# Patient Record
Sex: FEMALE | Race: OTHER | Employment: UNEMPLOYED | URBAN - METROPOLITAN AREA
[De-identification: names, ages, dates, MRNs, and addresses within clinical notes are randomized per-mention and may not be internally consistent; named-entity substitution may affect disease eponyms.]

---

## 2022-10-21 ENCOUNTER — OFFICE VISIT (OUTPATIENT)
Dept: FAMILY MEDICINE CLINIC | Age: 42
End: 2022-10-21

## 2022-10-21 VITALS
BODY MASS INDEX: 30.82 KG/M2 | HEIGHT: 60 IN | TEMPERATURE: 97.5 F | WEIGHT: 157 LBS | RESPIRATION RATE: 16 BRPM | OXYGEN SATURATION: 99 % | DIASTOLIC BLOOD PRESSURE: 77 MMHG | HEART RATE: 74 BPM | SYSTOLIC BLOOD PRESSURE: 125 MMHG

## 2022-10-21 DIAGNOSIS — E03.9 ACQUIRED HYPOTHYROIDISM: Primary | ICD-10-CM

## 2022-10-21 DIAGNOSIS — E03.9 ACQUIRED HYPOTHYROIDISM: ICD-10-CM

## 2022-10-21 DIAGNOSIS — Z13.6 SCREENING FOR ISCHEMIC HEART DISEASE: ICD-10-CM

## 2022-10-21 DIAGNOSIS — Z59.9 FINANCIAL DIFFICULTIES: ICD-10-CM

## 2022-10-21 DIAGNOSIS — Z59.89 UNINSURED: ICD-10-CM

## 2022-10-21 DIAGNOSIS — Z60.3 IMMIGRANT WITH LANGUAGE DIFFICULTY: ICD-10-CM

## 2022-10-21 LAB
BASOPHILS # BLD: 0 K/UL (ref 0–0.2)
BASOPHILS NFR BLD: 1 % (ref 0–2)
DIFFERENTIAL METHOD BLD: ABNORMAL
EOSINOPHIL # BLD: 0.2 K/UL (ref 0–0.8)
EOSINOPHIL NFR BLD: 3 % (ref 0.5–7.8)
ERYTHROCYTE [DISTWIDTH] IN BLOOD BY AUTOMATED COUNT: 17 % (ref 11.9–14.6)
HCT VFR BLD AUTO: 29.9 % (ref 35.8–46.3)
HGB BLD-MCNC: 8.7 G/DL (ref 11.7–15.4)
IMM GRANULOCYTES # BLD AUTO: 0 K/UL (ref 0–0.5)
IMM GRANULOCYTES NFR BLD AUTO: 0 % (ref 0–5)
LYMPHOCYTES # BLD: 1.8 K/UL (ref 0.5–4.6)
LYMPHOCYTES NFR BLD: 27 % (ref 13–44)
MCH RBC QN AUTO: 20.1 PG (ref 26.1–32.9)
MCHC RBC AUTO-ENTMCNC: 29.1 G/DL (ref 31.4–35)
MCV RBC AUTO: 69.1 FL (ref 82–102)
MONOCYTES # BLD: 0.5 K/UL (ref 0.1–1.3)
MONOCYTES NFR BLD: 8 % (ref 4–12)
NEUTS SEG # BLD: 4 K/UL (ref 1.7–8.2)
NEUTS SEG NFR BLD: 61 % (ref 43–78)
NRBC # BLD: 0 K/UL (ref 0–0.2)
PLATELET # BLD AUTO: 428 K/UL (ref 150–450)
PMV BLD AUTO: 9.6 FL (ref 9.4–12.3)
RBC # BLD AUTO: 4.33 M/UL (ref 4.05–5.2)
WBC # BLD AUTO: 6.6 K/UL (ref 4.3–11.1)

## 2022-10-21 PROCEDURE — 99203 OFFICE O/P NEW LOW 30 MIN: CPT | Performed by: NURSE PRACTITIONER

## 2022-10-21 SDOH — SOCIAL STABILITY - SOCIAL INSECURITY: ACCULTURATION DIFFICULTY: Z60.3

## 2022-10-21 SDOH — ECONOMIC STABILITY - INCOME SECURITY: PROBLEM RELATED TO HOUSING AND ECONOMIC CIRCUMSTANCES, UNSPECIFIED: Z59.9

## 2022-10-21 SDOH — ECONOMIC STABILITY - INCOME SECURITY: OTHER PROBLEMS RELATED TO HOUSING AND ECONOMIC CIRCUMSTANCES: Z59.89

## 2022-10-21 ASSESSMENT — ANXIETY QUESTIONNAIRES
GAD7 TOTAL SCORE: 0
6. BECOMING EASILY ANNOYED OR IRRITABLE: 0
3. WORRYING TOO MUCH ABOUT DIFFERENT THINGS: 0
7. FEELING AFRAID AS IF SOMETHING AWFUL MIGHT HAPPEN: 0
4. TROUBLE RELAXING: 0
IF YOU CHECKED OFF ANY PROBLEMS ON THIS QUESTIONNAIRE, HOW DIFFICULT HAVE THESE PROBLEMS MADE IT FOR YOU TO DO YOUR WORK, TAKE CARE OF THINGS AT HOME, OR GET ALONG WITH OTHER PEOPLE: NOT DIFFICULT AT ALL
2. NOT BEING ABLE TO STOP OR CONTROL WORRYING: 0
5. BEING SO RESTLESS THAT IT IS HARD TO SIT STILL: 0
1. FEELING NERVOUS, ANXIOUS, OR ON EDGE: 0

## 2022-10-21 ASSESSMENT — PATIENT HEALTH QUESTIONNAIRE - PHQ9
SUM OF ALL RESPONSES TO PHQ9 QUESTIONS 1 & 2: 0
1. LITTLE INTEREST OR PLEASURE IN DOING THINGS: 0
SUM OF ALL RESPONSES TO PHQ QUESTIONS 1-9: 0
2. FEELING DOWN, DEPRESSED OR HOPELESS: 0
SUM OF ALL RESPONSES TO PHQ QUESTIONS 1-9: 0

## 2022-10-21 ASSESSMENT — LIFESTYLE VARIABLES
HOW OFTEN DO YOU HAVE A DRINK CONTAINING ALCOHOL: MONTHLY OR LESS
HOW MANY STANDARD DRINKS CONTAINING ALCOHOL DO YOU HAVE ON A TYPICAL DAY: 1 OR 2

## 2022-10-21 NOTE — PROGRESS NOTES
[id number] [facility] (:  1980) is a 40 y.o. female,New patient, here for evaluation of the following chief complaint(s):  Hypothyroidism (Taking medication from her country)        SUBJECTIVE/OBJECTIVE:    HPI:  The patient is a 40 y.o. female Tajik speaking with [address] presents in [address] [place] [address] for thyroid medication refill. Patient is new with us. Has no blood work with us. She did not bring her medication bottle to show what she is taking. The Thyroid mediation was from her country. Explain to patient blood work needed to refill or start any thyroid medication. Patient made to understand to control or manage the thyroid well blood work needed as a guide to adjust the medication according to the level. Patient agreed to do blood work and follow up with the treatment. Patient denies fatigue or tiredness. /77 (Site: Left Upper Arm, Position: Sitting, Cuff Size: Medium Adult)   Pulse 74   Temp 97.5 °F (36.4 °C) (Temporal)   Resp 16   Ht 5' (1.524 m)   Wt 157 lb (71.2 kg)   SpO2 99%   BMI 30.66 kg/m²     No results found for: CBC, CMP, LIPIDPAN, TSH, HBA1C     Not on File    No current outpatient medications on file. No current facility-administered medications for this visit. History reviewed. No pertinent past medical history. History reviewed. No pertinent surgical history. Review of Systems   Constitutional:  Negative for fatigue. HENT:  Negative for congestion. Respiratory:  Negative for shortness of breath. Cardiovascular:  Negative for chest pain. Neurological:  Negative for dizziness. Physical Exam  Vitals reviewed. Constitutional:       Appearance: She is obese. HENT:      Head: Normocephalic. Nose: Nose normal.   Cardiovascular:      Heart sounds: Normal heart sounds. Pulmonary:      Effort: Pulmonary effort is normal.      Breath sounds: Normal breath sounds.    Musculoskeletal:         General: Normal range of motion. Cervical back: Normal range of motion. Neurological:      Mental Status: She is alert and oriented to person, place, and time. Psychiatric:         Mood and Affect: Mood normal.         Behavior: Behavior normal.         Thought Content: Thought content normal.         Judgment: Judgment normal.         ASSESSMENT/PLAN:  Blood work done. Lab result is pending. Follow up for blood work review and treatment. Patient preferred to follow up at the same location. [facility] calendar given. 1. Acquired hypothyroidism  -     T4, Free; Future  -     TSH; Future  -     Comprehensive Metabolic Panel; Future  -     CBC with Auto Differential; Future  2. Screening for ischemic heart disease  -     Lipid Panel; Future  3. Immigrant with language difficulty  4. Uninsured  5. Financial difficulties      Return in about 3 weeks (around 11/11/2022) for F/U Thyroid med/Result. On this date 10/21/2022 I have spent 30 minutes reviewing previous notes, test results and face to face with the patient discussing the diagnosis and importance of compliance with the treatment plan as well as documenting on the day of the visit. An electronic signature was used to authenticate this note.     --Sharif Garcia, ULYSSES - CNP

## 2022-10-23 LAB
ALBUMIN SERPL-MCNC: 3.8 G/DL (ref 3.5–5)
ALBUMIN/GLOB SERPL: 1 {RATIO} (ref 0.4–1.6)
ALP SERPL-CCNC: 89 U/L (ref 50–136)
ALT SERPL-CCNC: 21 U/L (ref 12–65)
ANION GAP SERPL CALC-SCNC: 9 MMOL/L (ref 2–11)
AST SERPL-CCNC: 11 U/L (ref 15–37)
BILIRUB SERPL-MCNC: 0.3 MG/DL (ref 0.2–1.1)
BUN SERPL-MCNC: 17 MG/DL (ref 6–23)
CALCIUM SERPL-MCNC: 9.5 MG/DL (ref 8.3–10.4)
CHLORIDE SERPL-SCNC: 111 MMOL/L (ref 101–110)
CHOLEST SERPL-MCNC: 152 MG/DL
CO2 SERPL-SCNC: 19 MMOL/L (ref 21–32)
CREAT SERPL-MCNC: 0.8 MG/DL (ref 0.6–1)
GLOBULIN SER CALC-MCNC: 4 G/DL (ref 2.8–4.5)
GLUCOSE SERPL-MCNC: 98 MG/DL (ref 65–100)
HDLC SERPL-MCNC: 46 MG/DL (ref 40–60)
HDLC SERPL: 3.3 {RATIO}
LDLC SERPL CALC-MCNC: 86 MG/DL
POTASSIUM SERPL-SCNC: 3.7 MMOL/L (ref 3.5–5.1)
PROT SERPL-MCNC: 7.8 G/DL (ref 6.3–8.2)
SODIUM SERPL-SCNC: 139 MMOL/L (ref 133–143)
T4 FREE SERPL-MCNC: 0.8 NG/DL (ref 0.78–1.46)
TRIGL SERPL-MCNC: 100 MG/DL (ref 35–150)
TSH, 3RD GENERATION: 3.99 UIU/ML (ref 0.36–3.74)
VLDLC SERPL CALC-MCNC: 20 MG/DL (ref 6–23)

## 2022-10-24 ASSESSMENT — ENCOUNTER SYMPTOMS: SHORTNESS OF BREATH: 0

## 2022-11-11 ENCOUNTER — OFFICE VISIT (OUTPATIENT)
Dept: FAMILY MEDICINE CLINIC | Age: 42
End: 2022-11-11

## 2022-11-11 VITALS
SYSTOLIC BLOOD PRESSURE: 115 MMHG | DIASTOLIC BLOOD PRESSURE: 85 MMHG | WEIGHT: 153 LBS | BODY MASS INDEX: 29.88 KG/M2 | HEART RATE: 83 BPM | TEMPERATURE: 97.4 F | OXYGEN SATURATION: 99 % | RESPIRATION RATE: 16 BRPM

## 2022-11-11 DIAGNOSIS — D64.9 ANEMIA, UNSPECIFIED TYPE: ICD-10-CM

## 2022-11-11 DIAGNOSIS — Z60.3 IMMIGRANT WITH LANGUAGE DIFFICULTY: ICD-10-CM

## 2022-11-11 DIAGNOSIS — E03.9 ACQUIRED HYPOTHYROIDISM: Primary | ICD-10-CM

## 2022-11-11 DIAGNOSIS — Z59.89 UNINSURED: ICD-10-CM

## 2022-11-11 PROCEDURE — 99214 OFFICE O/P EST MOD 30 MIN: CPT | Performed by: NURSE PRACTITIONER

## 2022-11-11 RX ORDER — FERROUS FUMARATE 324(106)MG
1 TABLET ORAL DAILY
Qty: 30 TABLET | Refills: 3 | Status: SHIPPED | OUTPATIENT
Start: 2022-11-11

## 2022-11-11 RX ORDER — LEVOTHYROXINE SODIUM 88 UG/1
88 TABLET ORAL
Qty: 90 TABLET | Refills: 0 | Status: SHIPPED | OUTPATIENT
Start: 2022-11-11

## 2022-11-11 SDOH — ECONOMIC STABILITY - INCOME SECURITY: OTHER PROBLEMS RELATED TO HOUSING AND ECONOMIC CIRCUMSTANCES: Z59.89

## 2022-11-11 SDOH — SOCIAL STABILITY - SOCIAL INSECURITY: ACCULTURATION DIFFICULTY: Z60.3

## 2022-11-11 ASSESSMENT — ENCOUNTER SYMPTOMS
SHORTNESS OF BREATH: 0
COUGH: 0

## 2022-11-11 ASSESSMENT — LIFESTYLE VARIABLES
HOW MANY STANDARD DRINKS CONTAINING ALCOHOL DO YOU HAVE ON A TYPICAL DAY: 1 OR 2
HOW OFTEN DO YOU HAVE A DRINK CONTAINING ALCOHOL: MONTHLY OR LESS

## 2022-11-11 NOTE — PROGRESS NOTES
[id number] [facility] (:  1980) is a 40 y.o. female,Established patient, here for evaluation of the following chief complaint(s):  Follow-up (Blood work result review)      SUBJECTIVE/OBJECTIVE:    HPI:  The 40 y. o.female Thai speaking with [facility]  presents in [address] [place] [address] for follow up blood review and treatment. Patient was taking medication from another country for thyroid. Reviewed the blood work with patient as below: Discussed treatment plan as well.     Component Ref Range & Units 10/21/22 1352    WBC 4.3 - 11.1 K/uL 6.6    RBC 4.05 - 5.2 M/uL 4.33    Hemoglobin 11.7 - 15.4 g/dL 8.7 Low     Hematocrit 35.8 - 46.3 % 29.9 Low     MCV 82 - 102 FL 69.1 Low     MCH 26.1 - 32.9 PG 20.1 Low     MCHC 31.4 - 35.0 g/dL 29.1 Low     RDW 11.9 - 14.6 % 17.0 High     Platelets 751 - 334 K/uL 428    MPV 9.4 - 12.3 FL 9.6    nRBC 0.0 - 0.2 K/uL 0.00    Comment: **Note: Absolute NRBC parameter is now reported with Hemogram**   Differential Type   AUTOMATED    Seg Neutrophils 43 - 78 % 61    Lymphocytes 13 - 44 % 27    Monocytes 4.0 - 12.0 % 8    Eosinophils % 0.5 - 7.8 % 3    Basophils 0.0 - 2.0 % 1    Immature Granulocytes 0.0 - 5.0 % 0    Segs Absolute 1.7 - 8.2 K/UL 4.0    Absolute Lymph # 0.5 - 4.6 K/UL 1.8    Absolute Mono # 0.1 - 1.3 K/UL 0.5    Absolute Eos # 0.0 - 0.8 K/UL 0.2    Basophils Absolute 0.0 - 0.2 K/UL 0.0    Absolute Immature Granulocyte 0.0 - 0.5 K/UL 0.0      Component Ref Range & Units 10/21/22 1352    TSH, 3RD GENERATION 0.358 - 3.740 uIU/mL 3.990 High       /85 (Site: Left Upper Arm, Position: Sitting, Cuff Size: Medium Adult)   Pulse 83   Temp 97.4 °F (36.3 °C) (Temporal)   Resp 16   Wt 153 lb (69.4 kg)   SpO2 99%   BMI 29.88 kg/m²     No results found for: CBC, CMP, LIPIDPAN, TSH, HBA1C     No Known Allergies    Current Outpatient Medications   Medication Sig Dispense Refill    levothyroxine (SYNTHROID) 88 MCG tablet Take 1 tablet by mouth every morning (before breakfast) 90 tablet 0    Ferrous Fumarate 324 (106 Fe) MG TABS Take 1 tablet by mouth daily 30 tablet 3     No current facility-administered medications for this visit. History reviewed. No pertinent past medical history. History reviewed. No pertinent surgical history. Review of Systems   Constitutional:  Negative for fatigue. HENT:  Negative for congestion. Respiratory:  Negative for cough and shortness of breath. Cardiovascular:  Negative for chest pain, palpitations and leg swelling. Physical Exam  Vitals reviewed. HENT:      Head: Normocephalic. Nose: Nose normal.   Cardiovascular:      Heart sounds: Normal heart sounds. Pulmonary:      Effort: Pulmonary effort is normal.      Breath sounds: Normal breath sounds. Musculoskeletal:         General: Normal range of motion. Cervical back: Normal range of motion. Neurological:      Mental Status: She is alert and oriented to person, place, and time. Psychiatric:         Mood and Affect: Mood normal.         Behavior: Behavior normal.         Thought Content: Thought content normal.         Judgment: Judgment normal.         ASSESSMENT/PLAN:  I instructed patient how to take Levothyroxine correctly (by itself, on an empty stomach with water, wait at least 30 minutes before eating or drinking or taking other meds, wait at least 3 hours before taking vitamins) May also take right before bed. 1. Acquired hypothyroidism  -     levothyroxine (SYNTHROID) 88 MCG tablet; Take 1 tablet by mouth every morning (before breakfast), Disp-90 tablet, R-0Print  2. Anemia, unspecified type  -     Ferrous Fumarate 324 (106 Fe) MG TABS; Take 1 tablet by mouth daily, Disp-30 tablet, R-3Print  3. Uninsured  4. Immigrant with language difficulty    Return in about 13 weeks (around 2/10/2023).       On this date 11/11/2022 I have spent 30 minutes reviewing previous notes, test results and face to face with the patient discussing the diagnosis and importance of compliance with the treatment plan as well as documenting on the day of the visit. An electronic signature was used to authenticate this note.     --ULYSSES Kemp - CNP

## 2023-02-03 ENCOUNTER — OFFICE VISIT (OUTPATIENT)
Dept: FAMILY MEDICINE CLINIC | Age: 43
End: 2023-02-03

## 2023-02-03 VITALS
DIASTOLIC BLOOD PRESSURE: 86 MMHG | RESPIRATION RATE: 16 BRPM | BODY MASS INDEX: 30.08 KG/M2 | OXYGEN SATURATION: 99 % | SYSTOLIC BLOOD PRESSURE: 120 MMHG | TEMPERATURE: 98 F | WEIGHT: 154 LBS | HEART RATE: 79 BPM

## 2023-02-03 DIAGNOSIS — D64.9 ANEMIA, UNSPECIFIED TYPE: ICD-10-CM

## 2023-02-03 DIAGNOSIS — Z59.89 UNINSURED: ICD-10-CM

## 2023-02-03 DIAGNOSIS — E03.9 ACQUIRED HYPOTHYROIDISM: ICD-10-CM

## 2023-02-03 DIAGNOSIS — E03.9 ACQUIRED HYPOTHYROIDISM: Primary | ICD-10-CM

## 2023-02-03 DIAGNOSIS — Z60.3 IMMIGRANT WITH LANGUAGE DIFFICULTY: ICD-10-CM

## 2023-02-03 DIAGNOSIS — Z59.9 FINANCIAL DIFFICULTIES: ICD-10-CM

## 2023-02-03 LAB
BASOPHILS # BLD: 0 K/UL (ref 0–0.2)
BASOPHILS NFR BLD: 0 % (ref 0–2)
DIFFERENTIAL METHOD BLD: ABNORMAL
EOSINOPHIL # BLD: 0.2 K/UL (ref 0–0.8)
EOSINOPHIL NFR BLD: 3 % (ref 0.5–7.8)
ERYTHROCYTE [DISTWIDTH] IN BLOOD BY AUTOMATED COUNT: 17 % (ref 11.9–14.6)
HCT VFR BLD AUTO: 41.9 % (ref 35.8–46.3)
HGB BLD-MCNC: 13.8 G/DL (ref 11.7–15.4)
IMM GRANULOCYTES # BLD AUTO: 0 K/UL (ref 0–0.5)
IMM GRANULOCYTES NFR BLD AUTO: 0 % (ref 0–5)
LYMPHOCYTES # BLD: 1.8 K/UL (ref 0.5–4.6)
LYMPHOCYTES NFR BLD: 25 % (ref 13–44)
MCH RBC QN AUTO: 26.3 PG (ref 26.1–32.9)
MCHC RBC AUTO-ENTMCNC: 32.9 G/DL (ref 31.4–35)
MCV RBC AUTO: 79.8 FL (ref 82–102)
MONOCYTES # BLD: 0.6 K/UL (ref 0.1–1.3)
MONOCYTES NFR BLD: 8 % (ref 4–12)
NEUTS SEG # BLD: 4.7 K/UL (ref 1.7–8.2)
NEUTS SEG NFR BLD: 64 % (ref 43–78)
NRBC # BLD: 0 K/UL (ref 0–0.2)
PLATELET # BLD AUTO: 403 K/UL (ref 150–450)
PMV BLD AUTO: 9.6 FL (ref 9.4–12.3)
RBC # BLD AUTO: 5.25 M/UL (ref 4.05–5.2)
WBC # BLD AUTO: 7.4 K/UL (ref 4.3–11.1)

## 2023-02-03 PROCEDURE — 99213 OFFICE O/P EST LOW 20 MIN: CPT | Performed by: NURSE PRACTITIONER

## 2023-02-03 SDOH — ECONOMIC STABILITY - INCOME SECURITY: OTHER PROBLEMS RELATED TO HOUSING AND ECONOMIC CIRCUMSTANCES: Z59.89

## 2023-02-03 SDOH — ECONOMIC STABILITY - INCOME SECURITY: PROBLEM RELATED TO HOUSING AND ECONOMIC CIRCUMSTANCES, UNSPECIFIED: Z59.9

## 2023-02-03 SDOH — SOCIAL STABILITY - SOCIAL INSECURITY: ACCULTURATION DIFFICULTY: Z60.3

## 2023-02-04 LAB — TSH, 3RD GENERATION: 2.73 UIU/ML (ref 0.36–3.74)

## 2023-02-06 ASSESSMENT — ENCOUNTER SYMPTOMS
CHEST TIGHTNESS: 0
SHORTNESS OF BREATH: 0
COUGH: 0

## 2023-02-17 ENCOUNTER — OFFICE VISIT (OUTPATIENT)
Dept: FAMILY MEDICINE CLINIC | Age: 43
End: 2023-02-17

## 2023-02-17 VITALS
WEIGHT: 155.8 LBS | TEMPERATURE: 97.4 F | OXYGEN SATURATION: 98 % | DIASTOLIC BLOOD PRESSURE: 89 MMHG | BODY MASS INDEX: 30.43 KG/M2 | SYSTOLIC BLOOD PRESSURE: 121 MMHG | RESPIRATION RATE: 16 BRPM | HEART RATE: 80 BPM

## 2023-02-17 DIAGNOSIS — Z59.89 UNINSURED: ICD-10-CM

## 2023-02-17 DIAGNOSIS — Z60.3 IMMIGRANT WITH LANGUAGE DIFFICULTY: ICD-10-CM

## 2023-02-17 DIAGNOSIS — D64.9 ANEMIA, UNSPECIFIED TYPE: ICD-10-CM

## 2023-02-17 DIAGNOSIS — E03.9 ACQUIRED HYPOTHYROIDISM: Primary | ICD-10-CM

## 2023-02-17 PROCEDURE — 99214 OFFICE O/P EST MOD 30 MIN: CPT | Performed by: NURSE PRACTITIONER

## 2023-02-17 RX ORDER — LEVOTHYROXINE SODIUM 88 UG/1
88 TABLET ORAL
Qty: 90 TABLET | Refills: 1 | Status: SHIPPED | OUTPATIENT
Start: 2023-02-17

## 2023-02-17 RX ORDER — FERROUS FUMARATE 324(106)MG
1 TABLET ORAL DAILY
Qty: 30 TABLET | Refills: 1 | Status: SHIPPED | OUTPATIENT
Start: 2023-02-17

## 2023-02-17 SDOH — SOCIAL STABILITY - SOCIAL INSECURITY: ACCULTURATION DIFFICULTY: Z60.3

## 2023-02-17 SDOH — ECONOMIC STABILITY - INCOME SECURITY: OTHER PROBLEMS RELATED TO HOUSING AND ECONOMIC CIRCUMSTANCES: Z59.89

## 2023-02-17 ASSESSMENT — ENCOUNTER SYMPTOMS
CHEST TIGHTNESS: 0
COUGH: 0
SHORTNESS OF BREATH: 0

## 2023-02-17 NOTE — PROGRESS NOTES
[id number] [facility] (:  1980) is a 40 y.o. female,Established patient, here for evaluation of the following chief complaint(s):  Thyroid Problem (Lab review and medication refill)        SUBJECTIVE/OBJECTIVE:    HPI:  40 y.o female Afghan speaking presents today in [address] [place] [address] for follow up with lab review and medication management. [employer]  with the patient. Lab result reviewed. Patient is taking medication as directed and indicated. Labs results are normal. There will be no change in thyroid medications. Patient denies fatigue, weak and tiredness at this visit.        Ref Range & Units 2/3/23 1053 10/21/22 1352   WBC 4.3 - 11.1 K/uL 7.4  6.6    RBC 4.05 - 5.2 M/uL 5.25 High   4.33    Hemoglobin 11.7 - 15.4 g/dL 13.8  8.7 Low     Hematocrit 35.8 - 46.3 % 41.9  29.9 Low     MCV 82 - 102 FL 79.8 Low   69.1 Low     MCH 26.1 - 32.9 PG 26.3  20.1 Low     MCHC 31.4 - 35.0 g/dL 32.9  29.1 Low     RDW 11.9 - 14.6 % 17.0 High   17.0 High     Platelets 034 - 394 K/uL 403  428    MPV 9.4 - 12.3 FL 9.6  9.6    nRBC 0.0 - 0.2 K/uL 0.00  0.00 CM    Comment: **Note: Absolute NRBC parameter is now reported with Hemogram**   Differential Type   AUTOMATED  AUTOMATED    Seg Neutrophils 43 - 78 % 64  61    Lymphocytes 13 - 44 % 25  27    Monocytes 4.0 - 12.0 % 8  8    Eosinophils % 0.5 - 7.8 % 3  3    Basophils 0.0 - 2.0 % 0  1    Immature Granulocytes 0.0 - 5.0 % 0  0    Segs Absolute 1.7 - 8.2 K/UL 4.7  4.0    Absolute Lymph # 0.5 - 4.6 K/UL 1.8  1.8    Absolute Mono # 0.1 - 1.3 K/UL 0.6  0.5    Absolute Eos # 0.0 - 0.8 K/UL 0.2  0.2    Basophils Absolute 0.0 - 0.2 K/UL 0.0  0.0    Absolute Immature Granulocyte 0.0 - 0.5 K/UL 0.0  0.0    Resulting Agency  [facility]            Component Ref Range & Units 2/3/23 1053 10/21/22 1352   TSH, 3RD GENERATION 0.358 - 3.740 uIU/mL 2.730  3.990 High         /89 (Site: Left Upper Arm, Position: Sitting, Cuff Size: Medium Adult)   Pulse 80 Temp 97.4 °F (36.3 °C) (Temporal)   Resp 16   Wt 155 lb 12.8 oz (70.7 kg)   SpO2 98%   BMI 30.43 kg/m²     No results found for: CBC, CMP, LIPIDPAN, TSH, HBA1C     No Known Allergies    Current Outpatient Medications   Medication Sig Dispense Refill    Ferrous Fumarate 324 (106 Fe) MG TABS Take 1 tablet by mouth daily 30 tablet 1    levothyroxine (SYNTHROID) 88 MCG tablet Take 1 tablet by mouth every morning (before breakfast) 90 tablet 1     No current facility-administered medications for this visit. History reviewed. No pertinent past medical history. History reviewed. No pertinent surgical history. Review of Systems   Constitutional:  Negative for fatigue. HENT:  Negative for congestion. Respiratory:  Negative for cough, chest tightness and shortness of breath. Cardiovascular:  Negative for chest pain. Physical Exam  Vitals reviewed. Constitutional:       Appearance: Normal appearance. She is obese. HENT:      Head: Normocephalic. Nose: Nose normal.   Cardiovascular:      Heart sounds: Normal heart sounds. Pulmonary:      Effort: Pulmonary effort is normal.      Breath sounds: Normal breath sounds. Musculoskeletal:         General: Normal range of motion. Cervical back: Normal range of motion. Neurological:      Mental Status: She is alert and oriented to person, place, and time. Psychiatric:         Mood and Affect: Mood normal.         Behavior: Behavior normal.         Thought Content: Thought content normal.         Judgment: Judgment normal.         ASSESSMENT/PLAN:  Continue with the same therapy. Eat food rich in protein. Iron medication takes 2-3 times a week. Exercise as tolerated encouraged. 30-40 mins daily walking. Do not runs out of thyroid medication. It is important to keep the appointment. 1. Acquired hypothyroidism  -     levothyroxine (SYNTHROID) 88 MCG tablet;  Take 1 tablet by mouth every morning (before breakfast), Disp-90 tablet, R-1Print  2. Anemia, unspecified type  -     Ferrous Fumarate 324 (106 Fe) MG TABS; Take 1 tablet by mouth daily, Disp-30 tablet, R-1Print  3. Uninsured  4. Immigrant with language difficulty    Return in about 25 weeks (around 8/11/2023) for F/U Thyroid med/blood work. On this date 2/17/2023 I have spent 30-39 minutes reviewing previous notes, test results and face to face with the patient discussing the diagnosis and importance of compliance with the treatment plan as well as documenting on the day of the visit. An electronic signature was used to authenticate this note.     --Severo Range, APRN - CNP

## 2023-07-28 ENCOUNTER — TELEPHONE (OUTPATIENT)
Dept: PRIMARY CARE CLINIC | Facility: CLINIC | Age: 43
End: 2023-07-28

## 2023-07-28 NOTE — TELEPHONE ENCOUNTER
Patient was called to let her know that she does not have a bill for the mobile unit appointment, and that she has a follow up on 08/11/2023 with TENNILLE Fairchild. Patient's both numbers are not available at the moment and could not leave a voicemail.

## 2023-09-21 DIAGNOSIS — E03.9 ACQUIRED HYPOTHYROIDISM: ICD-10-CM

## 2024-03-07 ENCOUNTER — OFFICE VISIT (OUTPATIENT)
Dept: FAMILY MEDICINE CLINIC | Age: 44
End: 2024-03-07

## 2024-03-07 VITALS
BODY MASS INDEX: 28.64 KG/M2 | HEIGHT: 61 IN | DIASTOLIC BLOOD PRESSURE: 80 MMHG | SYSTOLIC BLOOD PRESSURE: 122 MMHG | TEMPERATURE: 98.1 F | OXYGEN SATURATION: 98 % | WEIGHT: 151.68 LBS | HEART RATE: 85 BPM | RESPIRATION RATE: 15 BRPM

## 2024-03-07 DIAGNOSIS — E03.9 ACQUIRED HYPOTHYROIDISM: ICD-10-CM

## 2024-03-07 PROCEDURE — 99213 OFFICE O/P EST LOW 20 MIN: CPT | Performed by: NURSE PRACTITIONER

## 2024-03-07 RX ORDER — LEVOTHYROXINE SODIUM 88 UG/1
88 TABLET ORAL
Qty: 90 TABLET | Refills: 1 | OUTPATIENT
Start: 2024-03-07

## 2024-03-07 RX ORDER — LEVOTHYROXINE SODIUM 88 UG/1
88 TABLET ORAL
Qty: 90 TABLET | Refills: 1 | Status: SHIPPED | OUTPATIENT
Start: 2024-03-07

## 2024-03-07 ASSESSMENT — ENCOUNTER SYMPTOMS
CHEST TIGHTNESS: 0
SHORTNESS OF BREATH: 0

## 2024-03-07 NOTE — PROGRESS NOTES
Marcela Le (:  1980) is a 43 y.o. female,Established patient, here for evaluation of the following chief complaint(s):  Thyroid Problem (Patient seen for thyroid med refill, she do have her refill waiting for her already. )        SUBJECTIVE/OBJECTIVE:    HPI:  The 43 y.o female Welsh speaking present today for thryoid medication refill . Patient was given 6 months refill and had finished 3 months. Patient was to go to her pharmacy and refill the other 90 days. Patient encouraged to call the pharmacy for the refill.      Josafat #[id number] with patient.    /80 (Site: Right Upper Arm, Position: Sitting, Cuff Size: Large Adult)   Pulse 85   Temp 98.1 °F (36.7 °C) (Temporal)   Resp 15   Ht 1.56 m (5' 1.42\")   Wt 68.8 kg (151 lb 10.8 oz)   SpO2 98%   BMI 28.27 kg/m²     No results found for: \"CBC\", \"CMP\", \"LIPIDPAN\", \"TSH\", \"HBA1C\"     No Known Allergies    Current Outpatient Medications   Medication Sig Dispense Refill    levothyroxine (SYNTHROID) 88 MCG tablet Take 1 tablet by mouth every morning (before breakfast) 90 tablet 1    Ferrous Fumarate 324 (106 Fe) MG TABS Take 1 tablet by mouth daily 30 tablet 1     No current facility-administered medications for this visit.       History reviewed. No pertinent past medical history.    History reviewed. No pertinent surgical history.    Review of Systems   Constitutional:  Negative for fatigue.   HENT:  Negative for congestion.    Respiratory:  Negative for chest tightness and shortness of breath.    Cardiovascular:  Negative for chest pain and leg swelling.   Neurological:  Negative for headaches.       Physical Exam  Vitals reviewed.   Constitutional:       Appearance: She is obese.   HENT:      Head: Normocephalic.      Right Ear: Tympanic membrane, ear canal and external ear normal.      Left Ear: Tympanic membrane, ear canal and external ear normal.      Nose: Nose normal.      Mouth/Throat:      Mouth: Mucous

## 2025-02-03 ENCOUNTER — HOSPITAL ENCOUNTER (EMERGENCY)
Age: 45
Discharge: HOME | End: 2025-02-03
Attending: EMERGENCY MEDICINE
Payer: COMMERCIAL

## 2025-02-03 VITALS
OXYGEN SATURATION: 98 % | RESPIRATION RATE: 16 BRPM | WEIGHT: 151 LBS | HEIGHT: 61 IN | TEMPERATURE: 97.9 F | SYSTOLIC BLOOD PRESSURE: 125 MMHG | BODY MASS INDEX: 28.51 KG/M2 | HEART RATE: 92 BPM | DIASTOLIC BLOOD PRESSURE: 90 MMHG

## 2025-02-03 DIAGNOSIS — N75.1 BARTHOLIN'S GLAND ABSCESS: Primary | ICD-10-CM

## 2025-02-03 LAB
ALBUMIN SERPL-MCNC: 3.8 G/DL (ref 3.5–5)
ALBUMIN/GLOB SERPL: 0.9 (ref 1–1.9)
ALP SERPL-CCNC: 143 U/L (ref 35–104)
ALT SERPL-CCNC: 35 U/L (ref 8–45)
ANION GAP SERPL CALC-SCNC: 10 MMOL/L (ref 7–16)
AST SERPL-CCNC: 30 U/L (ref 15–37)
BASOPHILS # BLD: 0.05 K/UL (ref 0–0.2)
BASOPHILS NFR BLD: 0.4 % (ref 0–2)
BILIRUB SERPL-MCNC: 0.9 MG/DL (ref 0–1.2)
BUN SERPL-MCNC: 9 MG/DL (ref 6–23)
CALCIUM SERPL-MCNC: 8.8 MG/DL (ref 8.8–10.2)
CHLORIDE SERPL-SCNC: 111 MMOL/L (ref 98–107)
CO2 SERPL-SCNC: 18 MMOL/L (ref 20–29)
CREAT SERPL-MCNC: 0.61 MG/DL (ref 0.6–1.1)
DIFFERENTIAL METHOD BLD: ABNORMAL
EOSINOPHIL # BLD: 0.15 K/UL (ref 0–0.8)
EOSINOPHIL NFR BLD: 1.2 % (ref 0.5–7.8)
ERYTHROCYTE [DISTWIDTH] IN BLOOD BY AUTOMATED COUNT: 17.3 % (ref 11.9–14.6)
GLOBULIN SER CALC-MCNC: 4.4 G/DL (ref 2.3–3.5)
GLUCOSE SERPL-MCNC: 90 MG/DL (ref 70–99)
HCT VFR BLD AUTO: 38.9 % (ref 35.8–46.3)
HGB BLD-MCNC: 13.6 G/DL (ref 11.7–15.4)
IMM GRANULOCYTES # BLD AUTO: 0.11 K/UL (ref 0–0.5)
IMM GRANULOCYTES NFR BLD AUTO: 0.9 % (ref 0–5)
LYMPHOCYTES # BLD: 1.62 K/UL (ref 0.5–4.6)
LYMPHOCYTES NFR BLD: 12.9 % (ref 13–44)
MCH RBC QN AUTO: 28.1 PG (ref 26.1–32.9)
MCHC RBC AUTO-ENTMCNC: 35 G/DL (ref 31.4–35)
MCV RBC AUTO: 80.4 FL (ref 82–102)
MONOCYTES # BLD: 0.54 K/UL (ref 0.1–1.3)
MONOCYTES NFR BLD: 4.3 % (ref 4–12)
NEUTS SEG # BLD: 10.05 K/UL (ref 1.7–8.2)
NEUTS SEG NFR BLD: 80.3 % (ref 43–78)
NRBC # BLD: 0 K/UL (ref 0–0.2)
PLATELET # BLD AUTO: 343 K/UL (ref 150–450)
PMV BLD AUTO: 8.9 FL (ref 9.4–12.3)
POTASSIUM SERPL-SCNC: 3.7 MMOL/L (ref 3.5–5.1)
PROT SERPL-MCNC: 8.3 G/DL (ref 6.3–8.2)
RBC # BLD AUTO: 4.84 M/UL (ref 4.05–5.2)
SODIUM SERPL-SCNC: 140 MMOL/L (ref 136–145)
WBC # BLD AUTO: 12.5 K/UL (ref 4.3–11.1)

## 2025-02-03 PROCEDURE — 56420 I&D BARTHOLINS GLAND ABSCESS: CPT

## 2025-02-03 PROCEDURE — 80053 COMPREHEN METABOLIC PANEL: CPT

## 2025-02-03 PROCEDURE — 99283 EMERGENCY DEPT VISIT LOW MDM: CPT

## 2025-02-03 PROCEDURE — 85025 COMPLETE CBC W/AUTO DIFF WBC: CPT

## 2025-02-03 RX ORDER — SULFAMETHOXAZOLE AND TRIMETHOPRIM 800; 160 MG/1; MG/1
1 TABLET ORAL 2 TIMES DAILY
Qty: 20 TABLET | Refills: 0 | Status: SHIPPED | OUTPATIENT
Start: 2025-02-03 | End: 2025-02-13

## 2025-02-03 RX ORDER — NAPROXEN SODIUM 550 MG/1
550 TABLET ORAL 2 TIMES DAILY WITH MEALS
Qty: 30 TABLET | Refills: 0 | Status: SHIPPED | OUTPATIENT
Start: 2025-02-03

## 2025-02-03 ASSESSMENT — PAIN - FUNCTIONAL ASSESSMENT
PAIN_FUNCTIONAL_ASSESSMENT: 0-10
PAIN_FUNCTIONAL_ASSESSMENT: 0-10
PAIN_FUNCTIONAL_ASSESSMENT: NONE - DENIES PAIN

## 2025-02-03 ASSESSMENT — PAIN DESCRIPTION - LOCATION: LOCATION: GROIN

## 2025-02-03 ASSESSMENT — ENCOUNTER SYMPTOMS
ANAL BLEEDING: 0
CHEST TIGHTNESS: 0
COLOR CHANGE: 1
VOICE CHANGE: 0
CHOKING: 0
TROUBLE SWALLOWING: 0
ABDOMINAL PAIN: 0
PHOTOPHOBIA: 0

## 2025-02-03 ASSESSMENT — LIFESTYLE VARIABLES
HOW MANY STANDARD DRINKS CONTAINING ALCOHOL DO YOU HAVE ON A TYPICAL DAY: PATIENT DOES NOT DRINK
HOW OFTEN DO YOU HAVE A DRINK CONTAINING ALCOHOL: NEVER

## 2025-02-03 ASSESSMENT — PAIN SCALES - GENERAL: PAINLEVEL_OUTOF10: 9

## 2025-02-03 NOTE — ED NOTES
Patient mobility status  with no difficulty.     I have reviewed discharge instructions with the patient.  The patient verbalized understanding.    Patient left ED via Discharge Method: ambulatory to Home with  SELF .    Opportunity for questions and clarification provided.     Patient given 2 scripts.            Can Patiño RN  02/03/25 [id number]

## 2025-02-03 NOTE — ED TRIAGE NOTES
Pt ambulatory to ED w/ cc of painful mass in groin x 3 days. Pt states she has had these in the past but resolved in the past. Pt A&O x 4

## 2025-02-03 NOTE — ED PROVIDER NOTES
daily    LEVOTHYROXINE (SYNTHROID) 88 MCG TABLET    Take 1 tablet by mouth every morning (before breakfast)        Results from this emergency department visit:      Results for orders placed or performed during the hospital encounter of 02/03/25   CBC with Auto Differential   Result Value Ref Range    WBC 12.5 (H) 4.3 - 11.1 K/uL    RBC 4.84 4.05 - 5.2 M/uL    Hemoglobin 13.6 11.7 - 15.4 g/dL    Hematocrit 38.9 35.8 - 46.3 %    MCV 80.4 (L) 82 - 102 FL    MCH 28.1 26.1 - 32.9 PG    MCHC 35.0 31.4 - 35.0 g/dL    RDW 17.3 (H) 11.9 - 14.6 %    Platelets 343 150 - 450 K/uL    MPV 8.9 (L) 9.4 - 12.3 FL    nRBC 0.00 0.0 - 0.2 K/uL    Differential Type AUTOMATED      Neutrophils % 80.3 (H) 43.0 - 78.0 %    Lymphocytes % 12.9 (L) 13.0 - 44.0 %    Monocytes % 4.3 4.0 - 12.0 %    Eosinophils % 1.2 0.5 - 7.8 %    Basophils % 0.4 0.0 - 2.0 %    Immature Granulocytes % 0.9 0.0 - 5.0 %    Neutrophils Absolute 10.05 (H) 1.70 - 8.20 K/UL    Lymphocytes Absolute 1.62 0.50 - 4.60 K/UL    Monocytes Absolute 0.54 0.10 - 1.30 K/UL    Eosinophils Absolute 0.15 0.00 - 0.80 K/UL    Basophils Absolute 0.05 0.00 - 0.20 K/UL    Immature Granulocytes Absolute 0.11 0.0 - 0.5 K/UL   CMP   Result Value Ref Range    Sodium 140 136 - 145 mmol/L    Potassium 3.7 3.5 - 5.1 mmol/L    Chloride 111 (H) 98 - 107 mmol/L    CO2 18 (L) 20 - 29 mmol/L    Anion Gap 10 7 - 16 mmol/L    Glucose 90 70 - 99 mg/dL    BUN 9 6 - 23 MG/DL    Creatinine 0.61 0.60 - 1.10 MG/DL    Est, Glom Filt Rate >90 >60 ml/min/1.73m2    Calcium 8.8 8.8 - 10.2 MG/DL    Total Bilirubin 0.9 0.0 - 1.2 MG/DL    ALT 35 8 - 45 U/L    AST 30 15 - 37 U/L    Alk Phosphatase 143 (H) 35 - 104 U/L    Total Protein 8.3 (H) 6.3 - 8.2 g/dL    Albumin 3.8 3.5 - 5.0 g/dL    Globulin 4.4 (H) 2.3 - 3.5 g/dL    Albumin/Globulin Ratio 0.9 (L) 1.0 - 1.9           No orders to display                No results for input(s): \"COVID19\" in the last 72 hours.     Voice dictation software was used during the

## 2025-02-03 NOTE — DISCHARGE INSTRUCTIONS
Take antibiotics as prescribed  You will need to massage and apply a warm compress at least twice daily to help promote drainage and keep incision open  I have placed referral with gynecology for you to follow-up with them  Return to the ER for any new, worsening or life-threatening symptoms

## 2025-06-07 ENCOUNTER — HOSPITAL ENCOUNTER (OUTPATIENT)
Dept: MAMMOGRAPHY | Age: 45
Discharge: HOME | End: 2025-06-10
Payer: COMMERCIAL

## 2025-06-07 DIAGNOSIS — Z12.31 VISIT FOR SCREENING MAMMOGRAM: ICD-10-CM

## 2025-06-07 PROCEDURE — 77063 BREAST TOMOSYNTHESIS BI: CPT

## 2025-07-21 ENCOUNTER — OFFICE VISIT (OUTPATIENT)
Dept: ENDOCRINOLOGY | Age: 45
End: 2025-07-21
Payer: COMMERCIAL

## 2025-07-21 VITALS
HEART RATE: 72 BPM | SYSTOLIC BLOOD PRESSURE: 112 MMHG | WEIGHT: 153.2 LBS | HEIGHT: 61 IN | OXYGEN SATURATION: 98 % | DIASTOLIC BLOOD PRESSURE: 82 MMHG | BODY MASS INDEX: 28.92 KG/M2

## 2025-07-21 DIAGNOSIS — E03.9 ACQUIRED HYPOTHYROIDISM: ICD-10-CM

## 2025-07-21 DIAGNOSIS — E04.9 THYROID GOITER: Primary | ICD-10-CM

## 2025-07-21 PROCEDURE — 76536 US EXAM OF HEAD AND NECK: CPT | Performed by: STUDENT IN AN ORGANIZED HEALTH CARE EDUCATION/TRAINING PROGRAM

## 2025-07-21 PROCEDURE — 99204 OFFICE O/P NEW MOD 45 MIN: CPT | Performed by: STUDENT IN AN ORGANIZED HEALTH CARE EDUCATION/TRAINING PROGRAM

## 2025-07-21 RX ORDER — TRIAMCINOLONE ACETONIDE 0.25 MG/G
1 CREAM TOPICAL
COMMUNITY
Start: 2025-05-01

## 2025-07-21 ASSESSMENT — ENCOUNTER SYMPTOMS
TROUBLE SWALLOWING: 1
COUGH: 0
CHEST TIGHTNESS: 0
WHEEZING: 0
STRIDOR: 0
SHORTNESS OF BREATH: 0
SORE THROAT: 1
VOICE CHANGE: 1

## 2025-07-21 NOTE — CONSULTS
Session ID: [id number]  Session Duration: Longer than 46 minutes  Language: Pashto   ID: #[id number]   Name: Carolina

## 2025-07-21 NOTE — PROGRESS NOTES
Normal appearance. She is normal weight. She is not diaphoretic.   HENT:      Head: Normocephalic and atraumatic.      Mouth/Throat:      Mouth: Mucous membranes are moist.   Eyes:      Extraocular Movements: Extraocular movements intact.      Conjunctiva/sclera: Conjunctivae normal.      Pupils: Pupils are equal, round, and reactive to light.   Neck:      Thyroid: Thyromegaly (moderate, diffuse) and thyroid tenderness present. No thyroid mass.   Cardiovascular:      Rate and Rhythm: Normal rate and regular rhythm.      Pulses: Normal pulses.      Heart sounds: Normal heart sounds. No murmur heard.  Pulmonary:      Effort: Pulmonary effort is normal.      Breath sounds: Normal breath sounds.   Abdominal:      General: Abdomen is flat.      Palpations: Abdomen is soft.   Musculoskeletal:         General: No swelling or deformity.      Cervical back: Normal range of motion and neck supple. No tenderness.   Lymphadenopathy:      Cervical: No cervical adenopathy.   Skin:     General: Skin is warm and dry.   Neurological:      General: No focal deficit present.      Mental Status: She is alert and oriented to person, place, and time. Mental status is at baseline.   Psychiatric:         Mood and Affect: Mood normal.         Judgment: Judgment normal.              On this date 7/21/2025  I have spent 50 minutes reviewing previous notes, reviewing and interpreting prior test results, face to face with the patient, reviewing plan of care, and documenting on the day of the visit. More than 50% of the total time was spent face to face with patient on education, counseling, & coordination of care for the patient.    --Yaw Malik May, DO   An electronic signature was used to authenticate this note.     Patient was asked and verbally agreed to the use of an Artificial Intelligence scribe during this appointment. Elements of this note may have also been dictated via voice recognition software. Text and phrases may be limited by